# Patient Record
Sex: FEMALE | Race: WHITE
[De-identification: names, ages, dates, MRNs, and addresses within clinical notes are randomized per-mention and may not be internally consistent; named-entity substitution may affect disease eponyms.]

---

## 2020-01-30 ENCOUNTER — HOSPITAL ENCOUNTER (EMERGENCY)
Dept: HOSPITAL 74 - JERFT | Age: 60
Discharge: HOME | End: 2020-01-30
Payer: COMMERCIAL

## 2020-01-30 VITALS — BODY MASS INDEX: 27.6 KG/M2

## 2020-01-30 VITALS — HEART RATE: 85 BPM | SYSTOLIC BLOOD PRESSURE: 156 MMHG | DIASTOLIC BLOOD PRESSURE: 85 MMHG | TEMPERATURE: 97.9 F
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
## 2020-01-30 DIAGNOSIS — S61.216A: Primary | ICD-10-CM
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
## 2020-01-30 PROCEDURE — 3E0234Z INTRODUCTION OF SERUM, TOXOID AND VACCINE INTO MUSCLE, PERCUTANEOUS APPROACH: ICD-10-PCS | Performed by: NURSE PRACTITIONER

## 2020-01-30 PROCEDURE — 0HQFXZZ REPAIR RIGHT HAND SKIN, EXTERNAL APPROACH: ICD-10-PCS | Performed by: NURSE PRACTITIONER

## 2020-01-30 NOTE — PDOC
History of Present Illness





- General


Chief Complaint: Laceration


Stated Complaint: Injury


Time Seen by Provider: 01/30/20 18:56


History Source: Patient


Exam Limitations: No Limitations





- History of Present Illness


Initial Comments: 





01/30/20 21:17





HISTORY OF PRESENT ILLNESS: 59-year-old woman who presents emergency department 

for evaluation of laceration to right little finger while moving patio 

furniture.  She reports her hands came across exposed sharp metal vent which 

lacerated her hand.  Patient immediately put dressing on her hand and came to 

the emergency department for evaluation.  She denies any numbness or tingling 

or decrease in movement of her hands.  Patient is unsure of her last tetanus 

shot.  Right-hand-dominant.





No recent travel or sick contacts. 


PAST MEDICAL HISTORY: Denies past medical history


SURGICAL HISTORY: Denies


ALLERGIES: No known drug allergies





REVIEW OF SYSTEMS


General/Constitutional: Denies fever or chills. Denies weakness, weight change.


HEENT: Denies change in vision. Denies ear pain or discharge. Denies sore 

throat.


Cardiovascular: Denies chest pain or shortness of breath.


Respiratory: Denies cough, wheezing, or hemoptysis.


Gastrointestinal: Denies nausea, vomiting, diarrhea or constipation. Denies 

rectal bleeding.


Genitourinary: Denies dysuria, frequency, or change in urination.


Musculoskeletal: Denies joint or muscle swelling or pain. Denies neck or back 

pain.


Skin and breasts: See HPI


Neurologic: Denies headache, vertigo, loss of consciousness, or loss of 

sensation.


Psychiatric: Denies depression or anxiety.


Endocrine: Denies increased thirst. Denies abnormal weight change.


Hematologic/Lymphatic: Denies anemia, easy bleeding, or history of blood clots.


Allergic/Immunologic: Denies hives or skin allergy. Denies latex allergy.











PHYSICAL EXAM


General Appearance: Well-appearing, appropriately dressed.  No apparent distress

, no intoxication.


Musculoskeletal/Extremities:  Normal inspection. FROM of all extremities, 

normal capillary refill.  No tenderness to extremities, pedal edema, swelling, 

erythema or deformity.  Full flexion and extension of the fingers of the right 

hand.  Proper alignment noted.  No malrotation present with flexion of the 

fingers.  Full sensation distal to laceration.  Capillary refill is within 

normal limits.


Integumentary: Appropriate color, dry, warm.  No cyanosis, erythema, jaundice 

or rash.  Approximate 2.5 cm linear superficial laceration present to the 

dorsum of the right fifth finger.  No exposed tendon present.  Full range of 

motion of right fifth finger without difficulty.  Full sensation noted.








Past History





- Past Medical History


Allergies/Adverse Reactions: 


 Allergies











Allergy/AdvReac Type Severity Reaction Status Date / Time


 


No Known Allergies Allergy   Verified 01/30/20 18:58











Home Medications: 


Ambulatory Orders





Meloxicam 15 mg PO HS PRN 01/30/20 


Olopatadine HCl 1 drop OU BID 01/30/20 








COPD: No


Other medical history: arthritis





- Immunization History


Td Vaccination: No


Immunization Up to Date: No





- Psycho Social/Smoking Cessation Hx


Smoking History: Never smoked





*Physical Exam





- Vital Signs


 Last Vital Signs











Temp Pulse Resp BP Pulse Ox


 


 97.9 F   85   18   156/85   99 


 


 01/30/20 18:59  01/30/20 18:59  01/30/20 18:59  01/30/20 18:59  01/30/20 18:59














Procedures





- Consent


Consent obtained: Verbal, From Patient





- Laceration/Wound Repair


  ** Right Dorsal Finger 5th digit


Wound Length: 2.6 to 5.0 cm


Wound Explored: clean


Wound's Depth, Shape: superficial, linear


Irrigated w/ Saline: Yes


Betadine Prep: Yes


Anesthesia: 1% Lidocaine


Amount of Anesthetic (ccs): 3


Wound Debrided: minimal


Wound Repaired With: Sutures


Suture Size/Type: 5:0


Number of Sutures: 5


Layer Closure: No


Sterile Dressing Applied: Yes


Splint Applied: No


Sling Applied: No


Progress: 





01/30/20 21:40


Patient tolerated well.





Medical Decision Making





- Medical Decision Making





01/30/20 21:20


A/P:


59-year-old woman with laceration to right fifth finger





Boostrix


Laceration repair-see procedure note for details


Discharge home





Discharge





- Discharge Information


Problems reviewed: Yes


Clinical Impression/Diagnosis: 


Laceration of right little finger


Qualifiers:


 Encounter type: initial encounter Damage to nail status: without damage 

Foreign body presence: without foreign body Qualified Code(s): S61.216A - 

Laceration without foreign body of right little finger without damage to nail, 

initial encounter





Condition: Stable


Disposition: HOME





- Admission


No





- Follow up/Referral


Referrals: 


Nir Robles MD [Primary Care Provider] - 





- Patient Discharge Instructions


Additional Instructions: 


Keep wound clean and dry


Avoid strenuous activity/exercise to create a hot or sweaty environment until 

sutures are removed


Reapply bacitracin ointment 2 times a day until sutures are removed


Return to emergency Department or private physician in 7-10 days for suture 

removal


May use Tylenol or Motrin for pain relief


Return immediately to emergency department for redness, swelling, pain, or 

signs of infection











- Post Discharge Activity


Work/Back to School Note:  Back to Work

## 2020-01-30 NOTE — PDOC
Rapid Medical Evaluation


Time Seen by Provider: 01/30/20 18:56


Medical Evaluation: 





01/30/20 18:57





CC: R small finger laceration





Pt is a 58 y/o female who presents with a R small finger laceration. Unknown 

last tetanus. 





Brief exam: Sensation intact, pt able to move all fingers freely





Orders: boostrix





To ED for further evaluation





**Discharge Disposition





- Diagnosis


 Laceration of right little finger








- Referrals





- Patient Instructions





- Post Discharge Activity

## 2020-02-06 ENCOUNTER — HOSPITAL ENCOUNTER (EMERGENCY)
Dept: HOSPITAL 74 - JERFT | Age: 60
Discharge: HOME | End: 2020-02-06
Payer: COMMERCIAL

## 2020-02-06 VITALS — HEART RATE: 62 BPM | TEMPERATURE: 98.3 F | SYSTOLIC BLOOD PRESSURE: 122 MMHG | DIASTOLIC BLOOD PRESSURE: 80 MMHG

## 2020-02-06 VITALS — BODY MASS INDEX: 26.6 KG/M2

## 2020-02-06 DIAGNOSIS — Z48.817: Primary | ICD-10-CM

## 2020-02-06 NOTE — PDOC
Suture Removal/Wound Check HPI





- History of Present Illness


Chief Complaint: Suture/Staple Removal(Here)


Stated Complaint: SUTURE REMOVEL


Time Seen by Provider: 02/06/20 17:18


History Source: Yes: Patient


Exam Limitations: Yes: No Limitations


[facility]


Date of Last ED visit: 01/30/20





- Previous ED Treatment


Type of procedure performed on last visit: Yes: Laceration Repair


Tetanus Immunization: Yes: Up to Date





- Onset of Previous Treatment


Date of Occurence: 01/30/20





Past History





- Past Medical History


Allergies/Adverse Reactions: 


 Allergies











Allergy/AdvReac Type Severity Reaction Status Date / Time


 


No Known Allergies Allergy   Verified 02/06/20 17:35











Home Medications: 


Ambulatory Orders





Meloxicam 15 mg PO HS PRN 01/30/20 


Olopatadine HCl 1 drop OU BID 01/30/20 


Cephalexin Monohydrate [Keflex -] 500 mg PO Q6H #28 capsule 02/06/20 








COPD: No





- Immunization History


Td Vaccination: No


Immunization Up to Date: No





- Psycho Social/Smoking Cessation Hx


Smoking History: Never smoked


Have you smoked in the past 12 months: No


Information on smoking cessation initiated: No


Hx Alcohol Use: No


Drug/Substance Use Hx: No





Suture Removal/Wound Check PE





- Physical Exam


Laceration/Wound Check Symptoms: reports: None, Redness (Minimal, no pus or 

collection).  denies: Pain, Fever


Current Severity Level: None


Pain Localization: None


Location of Laceration/Wound: right: Finger (Base of right little finger on 

dorsum, sutures intact, they appear that they will open and if sutures are 

taken out, minimal redness, no discharge)





*Physical Exam





- Vital Signs


 Last Vital Signs











Temp Pulse Resp BP Pulse Ox


 


 98.3 F   62   20   122/80   99 


 


 02/06/20 17:15  02/06/20 17:15  02/06/20 17:15  02/06/20 17:15  02/06/20 17:15














Medical Decision Making





- Medical Decision Making





02/06/20 17:52


59-year-old female who had sutures placed to right little finger on 130, came 

here to see if she can get them removed because she is leaving on vacation 

tomorrow patient's laceration is healing well, minimal erythema, no signs of 

gross infection, able to move finger but wound appears to need to heal a few 

more days before sutures are removed.  Patient is going on vacation tomorrow 

she will come back Wednesday.  She will be placed on Keflex and she knows she 

can have the wound evaluated on Sunday or Monday where she is going or she can 

return to the ER on Wednesday when she returns home from her vacation.





Discussed issues, findings, results, applicable medications and treatments and 

follow-up. All these were understood and all questions were answered





Discharge





- Discharge Information


Problems reviewed: Yes


Clinical Impression/Diagnosis: 


 Visit for wound check





Condition: Stable


Disposition: HOME





- Admission


No





- Additional Discharge Information


Prescriptions: 


Cephalexin Monohydrate [Keflex -] 500 mg PO Q6H #28 capsule





- Follow up/Referral


Referrals: 


Nir Robles MD [Primary Care Provider] - 





- Patient Discharge Instructions


Additional Instructions: 


Clean with soap and water 2-3 times daily, apply bacitracin





Take the Keflex, 500 mg 4 times a day for 7 days





Have her reevaluated if redness, pus, fever or getting worse





Have sutures removed when you return on Wednesday or have reevaluated while you 

are on vacation





- Post Discharge Activity

## 2023-10-19 ENCOUNTER — HOSPITAL ENCOUNTER (EMERGENCY)
Dept: HOSPITAL 74 - JER | Age: 63
LOS: 1 days | Discharge: HOME | End: 2023-10-20
Payer: COMMERCIAL

## 2023-10-19 VITALS — RESPIRATION RATE: 18 BRPM

## 2023-10-19 VITALS — BODY MASS INDEX: 27.3 KG/M2

## 2023-10-19 DIAGNOSIS — R10.9: Primary | ICD-10-CM

## 2023-10-19 DIAGNOSIS — R30.0: ICD-10-CM

## 2023-10-19 DIAGNOSIS — N12: ICD-10-CM

## 2023-10-19 LAB
ALBUMIN SERPL-MCNC: 3.8 G/DL (ref 3.4–5)
ALP SERPL-CCNC: 78 U/L (ref 45–117)
ALT SERPL-CCNC: 32 U/L (ref 13–61)
ANION GAP SERPL CALC-SCNC: 7 MMOL/L (ref 4–13)
APPEARANCE UR: CLEAR
AST SERPL-CCNC: 25 U/L (ref 15–37)
BACTERIA # UR AUTO: 1596 /UL (ref 0–1359)
BASOPHILS # BLD: 0.4 % (ref 0–2)
BILIRUB SERPL-MCNC: 0.5 MG/DL (ref 0.2–1)
BILIRUB UR STRIP.AUTO-MCNC: NEGATIVE MG/DL
BUN SERPL-MCNC: 10.6 MG/DL (ref 7–18)
CALCIUM SERPL-MCNC: 9.2 MG/DL (ref 8.5–10.1)
CASTS URNS QL MICRO: 0 /UL (ref 0–3.1)
CHLORIDE SERPL-SCNC: 105 MMOL/L (ref 98–107)
CO2 SERPL-SCNC: 26 MMOL/L (ref 21–32)
COLOR UR: YELLOW
CREAT SERPL-MCNC: 0.7 MG/DL (ref 0.55–1.3)
DEPRECATED RDW RBC AUTO: 13.2 % (ref 11.6–15.6)
EOSINOPHIL # BLD: 2.3 % (ref 0–4.5)
EPITH CASTS URNS QL MICRO: 1 /UL (ref 0–25.1)
GLUCOSE SERPL-MCNC: 123 MG/DL (ref 74–106)
HCT VFR BLD CALC: 38.6 % (ref 32.4–45.2)
HGB BLD-MCNC: 13.3 GM/DL (ref 10.7–15.3)
KETONES UR QL STRIP: NEGATIVE
LEUKOCYTE ESTERASE UR QL STRIP.AUTO: (no result)
LYMPHOCYTES # BLD: 11.5 % (ref 8–40)
MCH RBC QN AUTO: 30.9 PG (ref 25.7–33.7)
MCHC RBC AUTO-ENTMCNC: 34.5 G/DL (ref 32–36)
MCV RBC: 89.8 FL (ref 80–96)
MONOCYTES # BLD AUTO: 8.9 % (ref 3.8–10.2)
NEUTROPHILS # BLD: 76.9 % (ref 42.8–82.8)
NITRITE UR QL STRIP: NEGATIVE
PH UR: 7.5 [PH] (ref 5–8)
PLATELET # BLD AUTO: 232 10^3/UL (ref 134–434)
PMV BLD: 7.9 FL (ref 7.5–11.1)
POTASSIUM SERPLBLD-SCNC: 3.8 MMOL/L (ref 3.5–5.1)
PROT SERPL-MCNC: 6.8 G/DL (ref 6.4–8.2)
PROT UR QL STRIP: (no result)
PROT UR QL STRIP: NEGATIVE
RBC # BLD AUTO: 329 /UL (ref 0–23.9)
RBC # BLD AUTO: 4.29 M/MM3 (ref 3.6–5.2)
SODIUM SERPL-SCNC: 138 MMOL/L (ref 136–145)
SP GR UR: 1.01 (ref 1.01–1.03)
UROBILINOGEN UR STRIP-MCNC: 1 MG/DL (ref 0.2–1)
WBC # BLD AUTO: 10.3 K/MM3 (ref 4–10)
WBC # UR AUTO: 1612 /UL (ref 0–25.8)

## 2023-10-19 PROCEDURE — 3E033NZ INTRODUCTION OF ANALGESICS, HYPNOTICS, SEDATIVES INTO PERIPHERAL VEIN, PERCUTANEOUS APPROACH: ICD-10-PCS

## 2023-10-20 VITALS — TEMPERATURE: 97.8 F | HEART RATE: 52 BPM | DIASTOLIC BLOOD PRESSURE: 85 MMHG | SYSTOLIC BLOOD PRESSURE: 131 MMHG

## 2024-08-21 ENCOUNTER — HOSPITAL ENCOUNTER (OUTPATIENT)
Dept: HOSPITAL 74 - JASU-ENDO | Age: 64
Discharge: HOME | End: 2024-08-21
Attending: INTERNAL MEDICINE
Payer: COMMERCIAL

## 2024-08-21 VITALS — TEMPERATURE: 98.2 F

## 2024-08-21 VITALS — HEART RATE: 52 BPM | DIASTOLIC BLOOD PRESSURE: 74 MMHG | SYSTOLIC BLOOD PRESSURE: 137 MMHG

## 2024-08-21 VITALS — BODY MASS INDEX: 28.7 KG/M2

## 2024-08-21 VITALS — RESPIRATION RATE: 18 BRPM

## 2024-08-21 DIAGNOSIS — K57.30: ICD-10-CM

## 2024-08-21 DIAGNOSIS — Z12.11: Primary | ICD-10-CM

## 2024-08-21 DIAGNOSIS — R10.84: ICD-10-CM

## 2024-08-21 DIAGNOSIS — R10.13: ICD-10-CM

## 2024-08-21 DIAGNOSIS — K21.00: ICD-10-CM

## 2024-08-21 DIAGNOSIS — R12: ICD-10-CM

## 2024-08-21 DIAGNOSIS — Z86.010: ICD-10-CM

## 2024-08-21 PROCEDURE — 0DB38ZX EXCISION OF LOWER ESOPHAGUS, VIA NATURAL OR ARTIFICIAL OPENING ENDOSCOPIC, DIAGNOSTIC: ICD-10-PCS | Performed by: INTERNAL MEDICINE

## 2024-08-21 PROCEDURE — 0DJD8ZZ INSPECTION OF LOWER INTESTINAL TRACT, VIA NATURAL OR ARTIFICIAL OPENING ENDOSCOPIC: ICD-10-PCS | Performed by: INTERNAL MEDICINE

## 2024-08-21 PROCEDURE — 0DB68ZX EXCISION OF STOMACH, VIA NATURAL OR ARTIFICIAL OPENING ENDOSCOPIC, DIAGNOSTIC: ICD-10-PCS | Performed by: INTERNAL MEDICINE

## 2024-08-21 PROCEDURE — 0DB98ZX EXCISION OF DUODENUM, VIA NATURAL OR ARTIFICIAL OPENING ENDOSCOPIC, DIAGNOSTIC: ICD-10-PCS | Performed by: INTERNAL MEDICINE

## 2024-08-21 PROCEDURE — 0DB28ZX EXCISION OF MIDDLE ESOPHAGUS, VIA NATURAL OR ARTIFICIAL OPENING ENDOSCOPIC, DIAGNOSTIC: ICD-10-PCS | Performed by: INTERNAL MEDICINE
